# Patient Record
Sex: MALE | Race: BLACK OR AFRICAN AMERICAN | Employment: OTHER | URBAN - METROPOLITAN AREA
[De-identification: names, ages, dates, MRNs, and addresses within clinical notes are randomized per-mention and may not be internally consistent; named-entity substitution may affect disease eponyms.]

---

## 2023-10-25 ENCOUNTER — HOSPITAL ENCOUNTER (EMERGENCY)
Age: 63
Discharge: HOME | End: 2023-10-25
Attending: EMERGENCY MEDICINE
Payer: OTHER GOVERNMENT

## 2023-10-25 ENCOUNTER — APPOINTMENT (OUTPATIENT)
Dept: CT IMAGING | Age: 63
End: 2023-10-25
Payer: OTHER GOVERNMENT

## 2023-10-25 VITALS
TEMPERATURE: 97.9 F | SYSTOLIC BLOOD PRESSURE: 116 MMHG | HEART RATE: 72 BPM | RESPIRATION RATE: 16 BRPM | WEIGHT: 198 LBS | HEIGHT: 69 IN | DIASTOLIC BLOOD PRESSURE: 76 MMHG | OXYGEN SATURATION: 100 % | BODY MASS INDEX: 29.33 KG/M2

## 2023-10-25 DIAGNOSIS — R10.31 ABDOMINAL PAIN, RLQ (RIGHT LOWER QUADRANT): Primary | ICD-10-CM

## 2023-10-25 DIAGNOSIS — Z87.09 HX OF PULMONARY FIBROSIS: ICD-10-CM

## 2023-10-25 DIAGNOSIS — M54.2 NECK PAIN: ICD-10-CM

## 2023-10-25 DIAGNOSIS — Z86.79 HX OF CORONARY ARTERY DISEASE: ICD-10-CM

## 2023-10-25 LAB
ALBUMIN SERPL-MCNC: 3.5 G/DL (ref 3.2–4.6)
ALBUMIN/GLOB SERPL: 0.9 (ref 0.4–1.6)
ALP SERPL-CCNC: 150 U/L (ref 50–136)
ALT SERPL-CCNC: 29 U/L (ref 12–65)
ANION GAP SERPL CALC-SCNC: 4 MMOL/L (ref 2–11)
AST SERPL-CCNC: 31 U/L (ref 15–37)
BACTERIA URNS QL MICRO: NEGATIVE /HPF
BASOPHILS # BLD: 0 K/UL (ref 0–0.2)
BASOPHILS NFR BLD: 1 % (ref 0–2)
BILIRUB SERPL-MCNC: 0.4 MG/DL (ref 0.2–1.1)
BILIRUB UR QL: NEGATIVE
BUN SERPL-MCNC: 15 MG/DL (ref 8–23)
CALCIUM SERPL-MCNC: 9 MG/DL (ref 8.3–10.4)
CASTS URNS QL MICRO: NORMAL /LPF
CHLORIDE SERPL-SCNC: 110 MMOL/L (ref 101–110)
CO2 SERPL-SCNC: 25 MMOL/L (ref 21–32)
CREAT SERPL-MCNC: 1.4 MG/DL (ref 0.8–1.5)
DIFFERENTIAL METHOD BLD: ABNORMAL
EOSINOPHIL # BLD: 0.2 K/UL (ref 0–0.8)
EOSINOPHIL NFR BLD: 3 % (ref 0.5–7.8)
EPI CELLS #/AREA URNS HPF: NORMAL /HPF
ERYTHROCYTE [DISTWIDTH] IN BLOOD BY AUTOMATED COUNT: 15.8 % (ref 11.9–14.6)
GLOBULIN SER CALC-MCNC: 3.7 G/DL (ref 2.8–4.5)
GLUCOSE SERPL-MCNC: 101 MG/DL (ref 65–100)
GLUCOSE UR QL STRIP.AUTO: 500 MG/DL
HCT VFR BLD AUTO: 36.5 % (ref 41.1–50.3)
HGB BLD-MCNC: 12.1 G/DL (ref 13.6–17.2)
IMM GRANULOCYTES # BLD AUTO: 0 K/UL (ref 0–0.5)
IMM GRANULOCYTES NFR BLD AUTO: 0 % (ref 0–5)
KETONES UR-MCNC: NEGATIVE MG/DL
LEUKOCYTE ESTERASE UR QL STRIP: NEGATIVE
LYMPHOCYTES # BLD: 1.5 K/UL (ref 0.5–4.6)
LYMPHOCYTES NFR BLD: 29 % (ref 13–44)
MCH RBC QN AUTO: 30.3 PG (ref 26.1–32.9)
MCHC RBC AUTO-ENTMCNC: 33.2 G/DL (ref 31.4–35)
MCV RBC AUTO: 91.3 FL (ref 82–102)
MONOCYTES # BLD: 0.6 K/UL (ref 0.1–1.3)
MONOCYTES NFR BLD: 12 % (ref 4–12)
NEUTS SEG # BLD: 2.9 K/UL (ref 1.7–8.2)
NEUTS SEG NFR BLD: 55 % (ref 43–78)
NITRITE UR QL: NEGATIVE
NRBC # BLD: 0 K/UL (ref 0–0.2)
PH UR: 5.5 (ref 5–9)
PLATELET # BLD AUTO: 231 K/UL (ref 150–450)
PMV BLD AUTO: 10.5 FL (ref 9.4–12.3)
POTASSIUM SERPL-SCNC: 4.5 MMOL/L (ref 3.5–5.1)
PROT SERPL-MCNC: 7.2 G/DL (ref 6.3–8.2)
PROT UR QL: NEGATIVE MG/DL
RBC # BLD AUTO: 4 M/UL (ref 4.23–5.6)
RBC # UR STRIP: NEGATIVE
RBC #/AREA URNS HPF: NORMAL /HPF
SERVICE CMNT-IMP: ABNORMAL
SODIUM SERPL-SCNC: 139 MMOL/L (ref 133–143)
SP GR UR: 1.02 (ref 1–1.02)
UROBILINOGEN UR QL: 0.2 EU/DL (ref 0.2–1)
WBC # BLD AUTO: 5.3 K/UL (ref 4.3–11.1)
WBC URNS QL MICRO: NORMAL /HPF

## 2023-10-25 PROCEDURE — 2580000003 HC RX 258: Performed by: EMERGENCY MEDICINE

## 2023-10-25 PROCEDURE — 70491 CT SOFT TISSUE NECK W/DYE: CPT

## 2023-10-25 PROCEDURE — 80053 COMPREHEN METABOLIC PANEL: CPT

## 2023-10-25 PROCEDURE — 6360000004 HC RX CONTRAST MEDICATION: Performed by: EMERGENCY MEDICINE

## 2023-10-25 PROCEDURE — 81001 URINALYSIS AUTO W/SCOPE: CPT

## 2023-10-25 PROCEDURE — 85025 COMPLETE CBC W/AUTO DIFF WBC: CPT

## 2023-10-25 PROCEDURE — 81015 MICROSCOPIC EXAM OF URINE: CPT

## 2023-10-25 PROCEDURE — 81003 URINALYSIS AUTO W/O SCOPE: CPT

## 2023-10-25 PROCEDURE — 74177 CT ABD & PELVIS W/CONTRAST: CPT

## 2023-10-25 PROCEDURE — 99285 EMERGENCY DEPT VISIT HI MDM: CPT

## 2023-10-25 RX ORDER — GABAPENTIN 300 MG/1
300 CAPSULE ORAL 3 TIMES DAILY
COMMUNITY

## 2023-10-25 RX ORDER — EPLERENONE 25 MG/1
25 TABLET, FILM COATED ORAL DAILY
COMMUNITY

## 2023-10-25 RX ORDER — METOPROLOL SUCCINATE 50 MG/1
50 TABLET, EXTENDED RELEASE ORAL DAILY
COMMUNITY

## 2023-10-25 RX ORDER — POTASSIUM CHLORIDE 1.5 G/1.58G
20 POWDER, FOR SOLUTION ORAL 2 TIMES DAILY
COMMUNITY

## 2023-10-25 RX ORDER — ALLOPURINOL 100 MG/1
100 TABLET ORAL DAILY
COMMUNITY

## 2023-10-25 RX ORDER — ALBUTEROL SULFATE 2.5 MG/3ML
2.5 SOLUTION RESPIRATORY (INHALATION) EVERY 6 HOURS PRN
COMMUNITY

## 2023-10-25 RX ORDER — ONDANSETRON 4 MG/1
4 TABLET, ORALLY DISINTEGRATING ORAL 3 TIMES DAILY PRN
Qty: 9 TABLET | Refills: 0 | Status: SHIPPED | OUTPATIENT
Start: 2023-10-25 | End: 2023-10-28

## 2023-10-25 RX ORDER — FUROSEMIDE 40 MG/1
40 TABLET ORAL DAILY
COMMUNITY

## 2023-10-25 RX ORDER — TRAMADOL HYDROCHLORIDE 50 MG/1
50 TABLET ORAL EVERY 6 HOURS PRN
COMMUNITY
End: 2023-10-25

## 2023-10-25 RX ORDER — LISINOPRIL 5 MG/1
5 TABLET ORAL DAILY
COMMUNITY

## 2023-10-25 RX ORDER — PANTOPRAZOLE SODIUM 40 MG/1
40 TABLET, DELAYED RELEASE ORAL DAILY
COMMUNITY

## 2023-10-25 RX ORDER — ASPIRIN 81 MG/1
81 TABLET ORAL DAILY
COMMUNITY

## 2023-10-25 RX ORDER — TRAMADOL HYDROCHLORIDE 50 MG/1
50 TABLET ORAL 3 TIMES DAILY PRN
Qty: 11 TABLET | Refills: 0 | Status: SHIPPED | OUTPATIENT
Start: 2023-10-25 | End: 2023-10-28

## 2023-10-25 RX ORDER — ATORVASTATIN CALCIUM 80 MG/1
80 TABLET, FILM COATED ORAL DAILY
COMMUNITY

## 2023-10-25 RX ORDER — NITROGLYCERIN 0.4 MG/1
0.4 TABLET SUBLINGUAL EVERY 5 MIN PRN
COMMUNITY

## 2023-10-25 RX ORDER — 0.9 % SODIUM CHLORIDE 0.9 %
1000 INTRAVENOUS SOLUTION INTRAVENOUS ONCE
Status: COMPLETED | OUTPATIENT
Start: 2023-10-25 | End: 2023-10-25

## 2023-10-25 RX ADMIN — DIATRIZOATE MEGLUMINE AND DIATRIZOATE SODIUM 15 ML: 660; 100 LIQUID ORAL; RECTAL at 11:52

## 2023-10-25 RX ADMIN — SODIUM CHLORIDE 1000 ML: 9 INJECTION, SOLUTION INTRAVENOUS at 11:52

## 2023-10-25 ASSESSMENT — PAIN SCALES - GENERAL: PAINLEVEL_OUTOF10: 7

## 2023-10-25 ASSESSMENT — ENCOUNTER SYMPTOMS
ABDOMINAL PAIN: 1
FACIAL SWELLING: 0
COUGH: 0
SHORTNESS OF BREATH: 0
NAUSEA: 0
RHINORRHEA: 0
VOMITING: 0

## 2023-10-25 ASSESSMENT — LIFESTYLE VARIABLES
HOW MANY STANDARD DRINKS CONTAINING ALCOHOL DO YOU HAVE ON A TYPICAL DAY: 1 OR 2
HOW OFTEN DO YOU HAVE A DRINK CONTAINING ALCOHOL: MONTHLY OR LESS

## 2023-10-25 ASSESSMENT — PAIN DESCRIPTION - LOCATION: LOCATION: GROIN

## 2023-10-25 NOTE — ED TRIAGE NOTES
Pt reports x2 months of cough and sore throat. Also reports x3 months of right groin pain, worse with movement. Denies urinary s/s. Reports was placed on Abx before but it did not help.

## 2023-10-25 NOTE — DISCHARGE INSTRUCTIONS
Please return with any fevers, vomiting, difficulty breathing, worsening symptoms, or additional concerns. Please follow-up with your various specialists for further evaluation.

## 2023-10-25 NOTE — ED PROVIDER NOTES
clear.  Parotid gland: Enhance symmetrically. Submandibular glands: Normal size and enhance symmetrically. Nasopharynx: Unremarkable. Oral cavity: No gross mass. Larynx: Symmetric. Thyroid: Tiny nodule on the left. Lymph nodes: No gross adenopathy. Lung apices:  Clear. Superior mediastinum: Unremarkable without adenopathy. Bones: Unremarkable. No suprasternal mass. Mild degenerative changes cervical  spine. Impression    No acute abnormality.          CBC with Auto Differential   Result Value Ref Range    WBC 5.3 4.3 - 11.1 K/uL    RBC 4.00 (L) 4.23 - 5.6 M/uL    Hemoglobin 12.1 (L) 13.6 - 17.2 g/dL    Hematocrit 36.5 (L) 41.1 - 50.3 %    MCV 91.3 82 - 102 FL    MCH 30.3 26.1 - 32.9 PG    MCHC 33.2 31.4 - 35.0 g/dL    RDW 15.8 (H) 11.9 - 14.6 %    Platelets 704 063 - 958 K/uL    MPV 10.5 9.4 - 12.3 FL    nRBC 0.00 0.0 - 0.2 K/uL    Differential Type AUTOMATED      Neutrophils % 55 43 - 78 %    Lymphocytes % 29 13 - 44 %    Monocytes % 12 4.0 - 12.0 %    Eosinophils % 3 0.5 - 7.8 %    Basophils % 1 0.0 - 2.0 %    Immature Granulocytes 0 0.0 - 5.0 %    Neutrophils Absolute 2.9 1.7 - 8.2 K/UL    Lymphocytes Absolute 1.5 0.5 - 4.6 K/UL    Monocytes Absolute 0.6 0.1 - 1.3 K/UL    Eosinophils Absolute 0.2 0.0 - 0.8 K/UL    Basophils Absolute 0.0 0.0 - 0.2 K/UL    Absolute Immature Granulocyte 0.0 0.0 - 0.5 K/UL   Comprehensive Metabolic Panel   Result Value Ref Range    Sodium 139 133 - 143 mmol/L    Potassium 4.5 3.5 - 5.1 mmol/L    Chloride 110 101 - 110 mmol/L    CO2 25 21 - 32 mmol/L    Anion Gap 4 2 - 11 mmol/L    Glucose 101 (H) 65 - 100 mg/dL    BUN 15 8 - 23 MG/DL    Creatinine 1.40 0.8 - 1.5 MG/DL    Est, Glom Filt Rate 56 (L) >60 ml/min/1.73m2    Calcium 9.0 8.3 - 10.4 MG/DL    Total Bilirubin 0.4 0.2 - 1.1 MG/DL    ALT 29 12 - 65 U/L    AST 31 15 - 37 U/L    Alk Phosphatase 150 (H) 50 - 136 U/L    Total Protein 7.2 6.3 - 8.2 g/dL    Albumin 3.5 3.2 - 4.6 g/dL    Globulin 3.7 2.8 - 4.5 g/dL

## 2023-10-30 ENCOUNTER — OFFICE VISIT (OUTPATIENT)
Age: 63
End: 2023-10-30
Payer: OTHER GOVERNMENT

## 2023-10-30 VITALS
BODY MASS INDEX: 30.51 KG/M2 | HEIGHT: 69 IN | WEIGHT: 206 LBS | DIASTOLIC BLOOD PRESSURE: 68 MMHG | SYSTOLIC BLOOD PRESSURE: 100 MMHG | HEART RATE: 63 BPM

## 2023-10-30 DIAGNOSIS — I50.22 CHRONIC HFREF (HEART FAILURE WITH REDUCED EJECTION FRACTION) (HCC): Primary | Chronic | ICD-10-CM

## 2023-10-30 DIAGNOSIS — I48.0 PAROXYSMAL ATRIAL FIBRILLATION (HCC): Chronic | ICD-10-CM

## 2023-10-30 DIAGNOSIS — I25.10 CORONARY ARTERY DISEASE INVOLVING NATIVE CORONARY ARTERY OF NATIVE HEART WITHOUT ANGINA PECTORIS: Chronic | ICD-10-CM

## 2023-10-30 PROCEDURE — 99244 OFF/OP CNSLTJ NEW/EST MOD 40: CPT | Performed by: INTERNAL MEDICINE

## 2023-10-30 PROCEDURE — 93000 ELECTROCARDIOGRAM COMPLETE: CPT | Performed by: INTERNAL MEDICINE

## 2023-10-30 RX ORDER — TRAMADOL HYDROCHLORIDE 50 MG/1
50 TABLET ORAL EVERY 6 HOURS PRN
COMMUNITY

## 2023-10-30 RX ORDER — LIDOCAINE 50 MG/G
1 PATCH TOPICAL DAILY
COMMUNITY

## 2023-10-30 ASSESSMENT — ENCOUNTER SYMPTOMS
COUGH: 1
NAUSEA: 0
SUSPICIOUS LESIONS: 0
VOMITING: 0
DIARRHEA: 0
SORE THROAT: 1
SHORTNESS OF BREATH: 1

## 2023-10-30 NOTE — PROGRESS NOTES
[id number] [facility], [address]  PHONE: [phone]    SUBJECTIVE:   Brien Moreno is a 58 y.o. male 1960   seen for a consultation visit regarding the following:     Chief Complaint   Patient presents with    New Patient    Congestive Heart Failure    Coronary Artery Disease              Consultation is requested for evaluation of New Patient, Congestive Heart Failure, and Coronary Artery Disease   . History of present illness: 58 y.o. male with PMH CAD s/p PCI dRCA, chronic HFrEF (EF 40% 10/2020), paroxysmal afib on Xarelto s/p ablation, ILD, OMID on CPAP presenting to establish care with cardiology. Patient recently moved from [facility]. He reports feeling well overall. He has occasional VO and light-headedness. He denies chest pain, palpitations, syncope. Past Medical History, Past Surgical History, Family history, Social History, and Medications were all reviewed with the patient today and updated as necessary. Allergies   Allergen Reactions    Nsaids      Kidney disease      Phenergan [Promethazine] Nausea And Vomiting     History reviewed. No pertinent past medical history. Past Surgical History:   Procedure Laterality Date    DIAGNOSTIC CARDIAC CATH LAB PROCEDURE      PTCA       Family History   Problem Relation Age of Onset    Kidney Disease Mother     Stroke Father      Social History     Tobacco Use    Smoking status: Never    Smokeless tobacco: Never   Substance Use Topics    Alcohol use: Not on file       ROS:    Review of Systems   Constitutional: Positive for malaise/fatigue and weight loss. Negative for chills, fever and weight gain. HENT:  Positive for sore throat. Negative for congestion. Rhinorrhea   Eyes:  Negative for visual disturbance. Cardiovascular:  Positive for dyspnea on exertion. Negative for chest pain, leg swelling, palpitations and syncope. Respiratory:  Positive for cough and shortness of breath. Endocrine: Positive for polyuria.

## 2023-10-31 ENCOUNTER — TELEPHONE (OUTPATIENT)
Dept: PULMONOLOGY | Age: 63
End: 2023-10-31

## 2023-10-31 NOTE — TELEPHONE ENCOUNTER
Type Date User Summary Attachment   Specialty Comments 10/26/2023 11:35 AM Charlette Lack Ref by Dr. Yashira Starkey for hx of pulmonary fibrosis. Previous imaging CXR 5/29/22 requested [facility], fax [phone]          Patient in ED 10/25/2023 abdominal pain RLQ, neck pain, pulmonary fibrosis, HX CAD-  Wed Oct 25, 2023   1544 His CT scans are unremarkable. I do not have an exact explanation for his symptoms but I do not find thing urgent or emergent. I will refer him to pulmonary and cardiology for his routine health issues and I will also refer him to GI for his symptoms. [AC]     Last Chest CT abd/pelvis:9/5/2020 [facility]  HISTORY:   ORDERING SYSTEM PROVIDED HISTORY:  Please assses for any evidence of bleeding as Hgb is dropping on systemic AC and pt does have recent hx of lung biopsy, also with labs suggestive of possible infecition and recent hx of pyelonephritis, please reassess this and for possible renal abscess,   Lungs/airways: Central airways are patent. No consolidation. Mid and lower lung predominant bronchovascular distributed ground-glass and interlobular septal thickening with corresponding traction bronchiectasis is stable from prior exams. Post VATS in the RML and RLL. I have faxed request to [facility] to obtain 9/5/2020 Ct chest.   Have spoken with Mrs Dallas Lyon in regard to referral and per NP, no additional imaging prior to appointment. Celeste-once imaging is received, schedule patient in soonest available appointment.

## 2023-11-13 ENCOUNTER — OFFICE VISIT (OUTPATIENT)
Dept: FAMILY MEDICINE CLINIC | Facility: CLINIC | Age: 63
End: 2023-11-13

## 2023-11-13 VITALS
WEIGHT: 205 LBS | HEART RATE: 70 BPM | RESPIRATION RATE: 17 BRPM | DIASTOLIC BLOOD PRESSURE: 78 MMHG | TEMPERATURE: 98.1 F | OXYGEN SATURATION: 98 % | HEIGHT: 69 IN | BODY MASS INDEX: 30.36 KG/M2 | SYSTOLIC BLOOD PRESSURE: 110 MMHG

## 2023-11-13 DIAGNOSIS — I10 ESSENTIAL HYPERTENSION: ICD-10-CM

## 2023-11-13 DIAGNOSIS — I25.10 CORONARY ARTERY DISEASE INVOLVING NATIVE CORONARY ARTERY OF NATIVE HEART WITHOUT ANGINA PECTORIS: ICD-10-CM

## 2023-11-13 DIAGNOSIS — N18.31 STAGE 3A CHRONIC KIDNEY DISEASE (HCC): ICD-10-CM

## 2023-11-13 DIAGNOSIS — M25.511 CHRONIC RIGHT SHOULDER PAIN: ICD-10-CM

## 2023-11-13 DIAGNOSIS — G89.29 CHRONIC RIGHT SHOULDER PAIN: ICD-10-CM

## 2023-11-13 DIAGNOSIS — Z76.89 ENCOUNTER TO ESTABLISH CARE WITH NEW DOCTOR: ICD-10-CM

## 2023-11-13 DIAGNOSIS — I50.22 CHRONIC HFREF (HEART FAILURE WITH REDUCED EJECTION FRACTION) (HCC): ICD-10-CM

## 2023-11-13 DIAGNOSIS — M25.661 JOINT STIFFNESS OF BOTH KNEES: ICD-10-CM

## 2023-11-13 DIAGNOSIS — R10.31 INGUINAL PAIN, RIGHT: ICD-10-CM

## 2023-11-13 DIAGNOSIS — I48.0 PAROXYSMAL ATRIAL FIBRILLATION (HCC): ICD-10-CM

## 2023-11-13 DIAGNOSIS — R05.1 ACUTE COUGH: Primary | ICD-10-CM

## 2023-11-13 DIAGNOSIS — M25.662 JOINT STIFFNESS OF BOTH KNEES: ICD-10-CM

## 2023-11-13 DIAGNOSIS — J84.9 INTERSTITIAL LUNG DISEASE (HCC): ICD-10-CM

## 2023-11-13 DIAGNOSIS — E66.9 CLASS 1 OBESITY WITH SERIOUS COMORBIDITY AND BODY MASS INDEX (BMI) OF 30.0 TO 30.9 IN ADULT, UNSPECIFIED OBESITY TYPE: ICD-10-CM

## 2023-11-13 PROBLEM — I48.91 ATRIAL FIBRILLATION WITH RVR (HCC): Status: RESOLVED | Noted: 2020-07-23 | Resolved: 2023-11-13

## 2023-11-13 PROBLEM — N18.30 CHRONIC KIDNEY DISEASE, STAGE III (MODERATE) (HCC): Status: ACTIVE | Noted: 2017-03-06

## 2023-11-13 PROBLEM — I48.91 ATRIAL FIBRILLATION WITH RVR (HCC): Status: ACTIVE | Noted: 2020-07-23

## 2023-11-13 RX ORDER — BENZONATATE 200 MG/1
200 CAPSULE ORAL 3 TIMES DAILY PRN
Qty: 30 CAPSULE | Refills: 0 | Status: SHIPPED | OUTPATIENT
Start: 2023-11-13

## 2023-11-13 RX ORDER — LANOLIN ALCOHOL/MO/W.PET/CERES
400 CREAM (GRAM) TOPICAL DAILY
COMMUNITY

## 2023-11-13 RX ORDER — ASPIRIN 81 MG/1
81 TABLET ORAL DAILY
COMMUNITY

## 2023-11-13 RX ORDER — MELOXICAM 7.5 MG/1
7.5 TABLET ORAL DAILY PRN
Qty: 10 TABLET | Refills: 0 | Status: CANCELLED | OUTPATIENT
Start: 2023-11-13 | End: 2023-11-23

## 2023-11-13 SDOH — ECONOMIC STABILITY: INCOME INSECURITY: HOW HARD IS IT FOR YOU TO PAY FOR THE VERY BASICS LIKE FOOD, HOUSING, MEDICAL CARE, AND HEATING?: NOT HARD AT ALL

## 2023-11-13 SDOH — ECONOMIC STABILITY: FOOD INSECURITY: WITHIN THE PAST 12 MONTHS, THE FOOD YOU BOUGHT JUST DIDN'T LAST AND YOU DIDN'T HAVE MONEY TO GET MORE.: NEVER TRUE

## 2023-11-13 SDOH — ECONOMIC STABILITY: HOUSING INSECURITY
IN THE LAST 12 MONTHS, WAS THERE A TIME WHEN YOU DID NOT HAVE A STEADY PLACE TO SLEEP OR SLEPT IN A SHELTER (INCLUDING NOW)?: NO

## 2023-11-13 SDOH — ECONOMIC STABILITY: FOOD INSECURITY: WITHIN THE PAST 12 MONTHS, YOU WORRIED THAT YOUR FOOD WOULD RUN OUT BEFORE YOU GOT MONEY TO BUY MORE.: NEVER TRUE

## 2023-11-13 ASSESSMENT — PATIENT HEALTH QUESTIONNAIRE - PHQ9
1. LITTLE INTEREST OR PLEASURE IN DOING THINGS: 0
2. FEELING DOWN, DEPRESSED OR HOPELESS: 0
SUM OF ALL RESPONSES TO PHQ9 QUESTIONS 1 & 2: 0
SUM OF ALL RESPONSES TO PHQ QUESTIONS 1-9: 0

## 2023-11-13 NOTE — PROGRESS NOTES
a follow-up appointment with pulmonology on 8 January and has a follow-up appointment with rheumatology on 13 January. Patient is also complaining of some shoulder pain, states he has a follow-up appointment scheduled with orthopedics, however would like to get this evaluated to see if something needs to be done urgently. Denies any other acute complaints or concerns today  Review of Systems   Denies any HA, fevers, chills, N/V,D, chest pain or palpitations, abdominal pain, hematuria, dysuria and hematochezia    Objective   Physical Exam   General: Well appearing, well nourished, NAD. Head: NCAT   Eyes: EOMI, PERRL, conjunctiva clear, sclera non-icteric  ENT: TMs translucent & mobile, hearing intact. Nose: No lesions, septum and turbinates normal  Oral cavity : No lesions, moist mucous membranes, no exudates or tonsillar hypertrophy   Neck: Supple, without lesions  Heart: RRR, No cardiomegaly,murmurs or gallop  Lungs:CTAB, no wheezes or rhonchi  GI: Normal bowel sounds, non tender, non distended, No masses or hernia  Tenderness to palpation along RLQ inguinal fold area  Skin: Good turgor, no rash, unusual bruising or prominent lesions  Extremities: No cyanosis or edema,  peripheral pulses intact, Capillary refill <3 sec. Musculoskeletal: ROM intact. No swelling or erythema. Shoulder Exam:   Inspection of right shoulder reveals no deformity/normal fairly developed musculature without atrophy. Palpation of shoulder reveals pain with mild tenderness wit palpation of the [facility] joint/subacromial area/bicipital groove. Decreased ROM . Jobs Test, Obriens, Neer and Picacho tests negative. Exam of the biceps shows no pain with resisted flexion or pronation. Speed's test/negative     Neurologic: CN II-XII intact. No focal deficits. Sensation intact. DTRs normal   Psychiatric: AAO X3, Appropriate judgment and insight, normal mood and affect.     On this date 11/13/2023 I have spent 62 minutes reviewing previous

## 2023-11-16 ENCOUNTER — TELEPHONE (OUTPATIENT)
Age: 63
End: 2023-11-16

## 2023-11-16 NOTE — TELEPHONE ENCOUNTER
Patient having Lumbar interlaminar epidural on TBD . Requesting Xarelto hold for 3 days prior.  Fax: [phone]

## 2023-11-28 ENCOUNTER — OFFICE VISIT (OUTPATIENT)
Dept: FAMILY MEDICINE CLINIC | Facility: CLINIC | Age: 63
End: 2023-11-28
Payer: OTHER GOVERNMENT

## 2023-11-28 VITALS
WEIGHT: 212 LBS | DIASTOLIC BLOOD PRESSURE: 66 MMHG | RESPIRATION RATE: 17 BRPM | HEIGHT: 69 IN | SYSTOLIC BLOOD PRESSURE: 110 MMHG | HEART RATE: 50 BPM | TEMPERATURE: 97.6 F | OXYGEN SATURATION: 98 % | BODY MASS INDEX: 31.4 KG/M2

## 2023-11-28 DIAGNOSIS — R21 RASH: ICD-10-CM

## 2023-11-28 DIAGNOSIS — L02.411 CUTANEOUS ABSCESS OF RIGHT AXILLA: Primary | ICD-10-CM

## 2023-11-28 PROCEDURE — 99214 OFFICE O/P EST MOD 30 MIN: CPT | Performed by: FAMILY MEDICINE

## 2023-11-28 PROCEDURE — 3078F DIAST BP <80 MM HG: CPT | Performed by: FAMILY MEDICINE

## 2023-11-28 PROCEDURE — 3074F SYST BP LT 130 MM HG: CPT | Performed by: FAMILY MEDICINE

## 2023-11-28 RX ORDER — DOXYCYCLINE 100 MG/1
100 TABLET ORAL 2 TIMES DAILY
Qty: 14 TABLET | Refills: 0 | Status: SHIPPED | OUTPATIENT
Start: 2023-11-28 | End: 2023-12-05

## 2023-11-28 ASSESSMENT — PATIENT HEALTH QUESTIONNAIRE - PHQ9
SUM OF ALL RESPONSES TO PHQ QUESTIONS 1-9: 0
SUM OF ALL RESPONSES TO PHQ9 QUESTIONS 1 & 2: 0
2. FEELING DOWN, DEPRESSED OR HOPELESS: 0
SUM OF ALL RESPONSES TO PHQ QUESTIONS 1-9: 0
1. LITTLE INTEREST OR PLEASURE IN DOING THINGS: 0

## 2023-11-28 NOTE — PROGRESS NOTES
Deedee Crespo (:  1960) is a 58 y.o. male,Established patient, here for evaluation of the following chief complaint(s):  Rash (Rash on left wrist.) and Other (Swollen lymph node under right armpit)     ASSESSMENT/PLAN:  1. Cutaneous abscess of right axilla  Likely a developing skin abscess, tender to palpation on physical exam,  less likely hidradenitis although it can be completed ruled out. Recommended warm compress for symptomatic relief, will prescribe antibiotics and consider incision and drainage if symptoms do not improve. - Doxycycline 100 mg BID for 7 days    2. Rash  Recommended topical skin lotion like CeraVe, also recommended adding hydrocortisone as needed a couple times per day for symptomatic relief from any itchiness    No follow-ups on file. Subjective   SUBJECTIVE/OBJECTIVE:  HPI  Mr. Juve Saucedo is a pleasant 59-year-old male who presents today with acute complaints of painful cyst in his right axilla and a rash on his left forearm wrist.  Patient states that he has had a cyst in his right axillary region for several years less than the size of pea, however noted that over the last day or two, it had increased in size. He states that it is painful to palpate, denies any discharge, fevers, chills or any other symptoms. For the rash on his left forearm, he states that he has tried applying lotions with some improvement and is wondering if there is anything else that could be done. Review of Systems  Denies any HA, fevers, chills, N/V,D, chest pain or palpitations, abdominal pain, hematuria, dysuria and hematochezia     Objective   Physical Exam   General: Well appearing, well nourished, NAD. Head: NCAT   Eyes: conjunctiva clear and antiicteric  Heart: RRR, No cardiomegaly,murmurs or gallop  Lungs:CTAB, no wheezes or rhonchi  Skin: Good turgor, No unusual bruising or prominent lesions.    Rash on fore arm  Induration tender to palpation about 2 cm in diameter on the right axillary

## 2023-12-13 ENCOUNTER — OFFICE VISIT (OUTPATIENT)
Age: 63
End: 2023-12-13
Payer: OTHER GOVERNMENT

## 2023-12-13 VITALS
SYSTOLIC BLOOD PRESSURE: 120 MMHG | WEIGHT: 207.6 LBS | HEART RATE: 74 BPM | BODY MASS INDEX: 30.75 KG/M2 | HEIGHT: 69 IN | DIASTOLIC BLOOD PRESSURE: 76 MMHG | RESPIRATION RATE: 16 BRPM

## 2023-12-13 DIAGNOSIS — R10.31 RLQ ABDOMINAL PAIN: Primary | ICD-10-CM

## 2023-12-13 PROCEDURE — 3078F DIAST BP <80 MM HG: CPT | Performed by: INTERNAL MEDICINE

## 2023-12-13 PROCEDURE — 3074F SYST BP LT 130 MM HG: CPT | Performed by: INTERNAL MEDICINE

## 2023-12-13 PROCEDURE — 99204 OFFICE O/P NEW MOD 45 MIN: CPT | Performed by: INTERNAL MEDICINE

## 2024-01-02 ENCOUNTER — OFFICE VISIT (OUTPATIENT)
Age: 64
End: 2024-01-02
Payer: OTHER GOVERNMENT

## 2024-01-02 VITALS
SYSTOLIC BLOOD PRESSURE: 100 MMHG | HEIGHT: 69 IN | DIASTOLIC BLOOD PRESSURE: 70 MMHG | WEIGHT: 212 LBS | BODY MASS INDEX: 31.4 KG/M2 | HEART RATE: 72 BPM

## 2024-01-02 DIAGNOSIS — I48.0 PAROXYSMAL ATRIAL FIBRILLATION (HCC): Chronic | ICD-10-CM

## 2024-01-02 DIAGNOSIS — I25.10 CORONARY ARTERY DISEASE INVOLVING NATIVE CORONARY ARTERY OF NATIVE HEART WITHOUT ANGINA PECTORIS: Chronic | ICD-10-CM

## 2024-01-02 DIAGNOSIS — I50.22 CHRONIC HFREF (HEART FAILURE WITH REDUCED EJECTION FRACTION) (HCC): Primary | Chronic | ICD-10-CM

## 2024-01-02 PROCEDURE — 99214 OFFICE O/P EST MOD 30 MIN: CPT | Performed by: INTERNAL MEDICINE

## 2024-01-02 PROCEDURE — 3074F SYST BP LT 130 MM HG: CPT | Performed by: INTERNAL MEDICINE

## 2024-01-02 PROCEDURE — 3078F DIAST BP <80 MM HG: CPT | Performed by: INTERNAL MEDICINE

## 2024-01-02 ASSESSMENT — ENCOUNTER SYMPTOMS
BACK PAIN: 1
SHORTNESS OF BREATH: 0

## 2024-01-02 NOTE — PROGRESS NOTES
AM          EKG        CXR/IMAGING        DEVICE INTERROGATION        OUTSIDE RECORDS REVIEW    Records from outside providers have been reviewed and summarized as noted in the HPI, past history and data review sections of this note, and reviewed with patient. .       ASSESSMENT and PLAN    Chronic HFrEF (heart failure with reduced ejection fraction) (HCC)  - Ischemic cardiomyopathy. EF 40-45% with inferior WMA on recent TTE. Euvolemic on exam.  - Continue home GDMT: Jardiance 10mg PO qday, lisinopril 5mg PO qday, eplerenone 25mg PO qday, Toprol XL 50mg PO qday. Defer further uptitration given low-normal BP.  - Continue Lasix 40mg PO qday.  Coronary artery disease involving native coronary artery of native heart without angina pectoris  - H/o distal RCA thrombus s/p thrombectomy and JE. No angina at this time.  - Continue home Xarelto. Continue atorvastatin 80mg.   -     EKG 12 lead  Paroxysmal atrial fibrillation (HCC)  - RRR today. Continue home metoprolol  - [id number] at least 3 (HTN, HF, MI). Continue home Xarelto.      Return in about 6 months (around 7/2/2024) for Routine follow up.          Thank you for allowing me to participate in this patient's care.  Please call or contact me if there are any questions or concerns regarding the above.      Tramaine Villafuerte,   01/02/24  11:13 AM

## 2024-01-09 DIAGNOSIS — J84.9 INTERSTITIAL LUNG DISEASE (HCC): Primary | ICD-10-CM

## 2024-01-10 ENCOUNTER — OFFICE VISIT (OUTPATIENT)
Dept: PULMONOLOGY | Age: 64
End: 2024-01-10
Payer: OTHER GOVERNMENT

## 2024-01-10 VITALS
WEIGHT: 209 LBS | HEIGHT: 69 IN | HEART RATE: 93 BPM | DIASTOLIC BLOOD PRESSURE: 76 MMHG | BODY MASS INDEX: 30.96 KG/M2 | SYSTOLIC BLOOD PRESSURE: 110 MMHG | OXYGEN SATURATION: 93 % | RESPIRATION RATE: 17 BRPM

## 2024-01-10 DIAGNOSIS — R76.8 ANA POSITIVE: ICD-10-CM

## 2024-01-10 DIAGNOSIS — J84.9 ILD (INTERSTITIAL LUNG DISEASE) (HCC): Primary | ICD-10-CM

## 2024-01-10 DIAGNOSIS — R91.1 PULMONARY NODULE: ICD-10-CM

## 2024-01-10 LAB
EXPIRATORY TIME: NORMAL
FEF 25-75% %PRED-PRE: NORMAL
FEF 25-75% PRED: NORMAL
FEF 25-75%-PRE: NORMAL
FEV1 %PRED-PRE: 100 %
FEV1 PRED: NORMAL
FEV1/FVC %PRED-PRE: NORMAL
FEV1/FVC PRED: NORMAL
FEV1/FVC: 80 %
FEV1: 2.93 L
FVC %PRED-PRE: 96 %
FVC PRED: NORMAL
FVC: 3.66 L
PEF %PRED-PRE: NORMAL
PEF PRED: NORMAL
PEF-PRE: NORMAL

## 2024-01-10 PROCEDURE — 99204 OFFICE O/P NEW MOD 45 MIN: CPT | Performed by: INTERNAL MEDICINE

## 2024-01-10 PROCEDURE — 94010 BREATHING CAPACITY TEST: CPT | Performed by: INTERNAL MEDICINE

## 2024-01-10 PROCEDURE — 3078F DIAST BP <80 MM HG: CPT | Performed by: INTERNAL MEDICINE

## 2024-01-10 PROCEDURE — 3074F SYST BP LT 130 MM HG: CPT | Performed by: INTERNAL MEDICINE

## 2024-01-10 ASSESSMENT — PULMONARY FUNCTION TESTS
FVC: 3.66
FVC_PERCENT_PREDICTED_PRE: 96
FEV1: 2.93
FEV1/FVC: 80
FEV1_PERCENT_PREDICTED_PRE: 100

## 2024-01-10 NOTE — PROGRESS NOTES
Name:  Kyle Rojo  YOB: 1960   MRN: [id number]      Office Visit: 1/10/2024        ASSESSMENT AND PLAN:  (Medical Decision Making)    Impression: 63 y.o. male with ILD s/p VATS biopsy 7/21/20 showing +UIP though exact diagnosis seems to be unclear from the records regardless he has been stable for several years on Rituxan every 6 months.    1. ILD (interstitial lung disease) (HCC)  Radiographically this appears to be NSIP and overall stable compared to 2020, clinically improved from initial diagnosis on Rituxan every 6 months, off steroids.  He did previously have positive rheumatoid factor and MARTA and has a rheumatology appointment scheduled next month.  I will be interested in their opinion.  He certainly seems to have clinical response to Rituxan and wonder if this could be a mixed connective tissue versus just primary NSIP.  Regardless it appears that he has clinically responded quite well to Rituxan every 6 months.  Recommend continuing supportive therapy.  He is up-to-date on his influenza vaccine, and Pneumovax and agrees to get RSV and updated COVID-vaccine as well.  - Spirometry Without Bronchodilator  - CT CHEST WO CONTRAST; Future  - DME - DURABLE MEDICAL EQUIPMENT  - respiratory syncytial vaccine, adjuvanted (AREXVY) 120 MCG/0.5ML injection; Inject 0.5 mLs into the muscle once for 1 dose  Dispense: 0.5 mL; Refill: 0    2. Pulmonary nodule  Had a pulmonary nodule that was last imaged April 2023.  The reports are not available, but described the notes that needed follow-up.  I will get this done in 3 months for 1 year follow-up and then decide if he needs further imaging at that point.  - CT CHEST WO CONTRAST; Future    3. MARTA positive  Also has a positive rheumatoid factor.  Rheumatology visit next month as above.  Will be interested in their opinion.  He is try to get Rituxan every 6 months set up locally.    Orders Placed This Encounter   Medications    respiratory syncytial

## 2024-02-07 RX ORDER — EPLERENONE 25 MG/1
12.5 TABLET, FILM COATED ORAL DAILY
Qty: 45 TABLET | Refills: 3 | Status: SHIPPED | OUTPATIENT
Start: 2024-02-07

## 2024-02-07 NOTE — TELEPHONE ENCOUNTER
MEDICATION REFILL REQUEST      Name of Medication:  Eplerenone  Dose:  25 mg  Frequency:  1/2 pill  a day  Quantity:  45  Days' supply:  90      Pharmacy Name/Location:  [facility]- [phone]  Please fax in rx asap ,pt has been with out for 2 weeks

## 2024-02-07 NOTE — TELEPHONE ENCOUNTER
Requested Prescriptions     Pending Prescriptions Disp Refills    eplerenone (INSPRA) 25 MG tablet 45 tablet 3     Sig: Take 0.5 tablets by mouth daily 1/2 tablet

## 2024-02-09 ENCOUNTER — OFFICE VISIT (OUTPATIENT)
Dept: FAMILY MEDICINE CLINIC | Facility: CLINIC | Age: 64
End: 2024-02-09
Payer: OTHER GOVERNMENT

## 2024-02-09 VITALS
HEIGHT: 69 IN | TEMPERATURE: 97.2 F | RESPIRATION RATE: 18 BRPM | WEIGHT: 217 LBS | DIASTOLIC BLOOD PRESSURE: 68 MMHG | HEART RATE: 62 BPM | OXYGEN SATURATION: 98 % | SYSTOLIC BLOOD PRESSURE: 102 MMHG | BODY MASS INDEX: 32.14 KG/M2

## 2024-02-09 DIAGNOSIS — R10.31 INGUINAL PAIN, RIGHT: ICD-10-CM

## 2024-02-09 DIAGNOSIS — R39.11 URINARY HESITANCY: ICD-10-CM

## 2024-02-09 DIAGNOSIS — R35.0 INCREASED URINARY FREQUENCY: Primary | ICD-10-CM

## 2024-02-09 DIAGNOSIS — R30.0 DYSURIA: ICD-10-CM

## 2024-02-09 DIAGNOSIS — L72.3 SEBACEOUS CYST OF RIGHT AXILLA: ICD-10-CM

## 2024-02-09 LAB — PSA SERPL-MCNC: 3.2 NG/ML

## 2024-02-09 PROCEDURE — 99214 OFFICE O/P EST MOD 30 MIN: CPT | Performed by: FAMILY MEDICINE

## 2024-02-09 PROCEDURE — 3074F SYST BP LT 130 MM HG: CPT | Performed by: FAMILY MEDICINE

## 2024-02-09 PROCEDURE — 3078F DIAST BP <80 MM HG: CPT | Performed by: FAMILY MEDICINE

## 2024-02-09 RX ORDER — CLINDAMYCIN PHOSPHATE 10 MG/G
GEL TOPICAL
Qty: 30 G | Refills: 0 | Status: SHIPPED | OUTPATIENT
Start: 2024-02-09 | End: 2024-02-16

## 2024-02-09 RX ORDER — TAMSULOSIN HYDROCHLORIDE 0.4 MG/1
0.4 CAPSULE ORAL DAILY
Qty: 30 CAPSULE | Refills: 0 | Status: SHIPPED | OUTPATIENT
Start: 2024-02-09 | End: 2024-03-10

## 2024-02-09 SDOH — ECONOMIC STABILITY: INCOME INSECURITY: HOW HARD IS IT FOR YOU TO PAY FOR THE VERY BASICS LIKE FOOD, HOUSING, MEDICAL CARE, AND HEATING?: NOT HARD AT ALL

## 2024-02-09 SDOH — ECONOMIC STABILITY: FOOD INSECURITY: WITHIN THE PAST 12 MONTHS, YOU WORRIED THAT YOUR FOOD WOULD RUN OUT BEFORE YOU GOT MONEY TO BUY MORE.: NEVER TRUE

## 2024-02-09 SDOH — ECONOMIC STABILITY: FOOD INSECURITY: WITHIN THE PAST 12 MONTHS, THE FOOD YOU BOUGHT JUST DIDN'T LAST AND YOU DIDN'T HAVE MONEY TO GET MORE.: NEVER TRUE

## 2024-02-09 ASSESSMENT — PATIENT HEALTH QUESTIONNAIRE - PHQ9
SUM OF ALL RESPONSES TO PHQ QUESTIONS 1-9: 0
1. LITTLE INTEREST OR PLEASURE IN DOING THINGS: 0
2. FEELING DOWN, DEPRESSED OR HOPELESS: 0

## 2024-02-09 NOTE — PROGRESS NOTES
Kyle Rojo (:  1960) is a 63 y.o. male,Established patient, here for evaluation of the following chief complaint(s):  Hypertension (3 month follow up), Medication Refill, and OTHER (Discuss personal concerns)         ASSESSMENT/PLAN:  1. Increased urinary frequency  -     PSA Screening; Future  2. Dysuria  3. Urinary hesitancy  Based on patient's presenting symptoms, likely neurogenic bladder versus BPH?  Will obtain a PSA level given his family history.  Review of EMR also shows that based on all the testing met that he was towards the higher end of the normal range for PSA levels.  Also recommended scheduled voiding, will start him on tamsulosin 0.4 mg daily   -Will consider referral to urology if his symptoms persist or worsen over the next 4 to 6 weeks pending PSA test results  -     PSA Screening; Future  4. Inguinal pain, right  Patient has had an evaluation in the ED with a CT abdomen abdomen and pelvis which is noted to be unremarkable, he had a follow-up with GI for RLQ pain and they were planning a follow-up colonoscopy if symptoms persist.  Recommended he revisit in the ED for a CT scan should he experience any exacerbation of his symptoms to get a more clear assessment of his appendix which showed some mild changes.  -Based on patient's clinical history provided today and location of pain, this appears to be a groin strain.  No hernias noted.  Plenty of reassurance provided on a handout with groin exercises was also provided  5. Sebaceous cyst of right axilla  Does not appear to be infected, will hold off any oral antibiotics for now.  I did recommend he could try topical clindamycin to see if that helps resolve the sporadic foul order potentially from reinfection of the cyst?.  -     clindamycin (CLEOCIN-T) 1 % gel; Apply topically 2 times daily., Disp-30 g, R-0, Normal    No follow-ups on file.       Subjective   SUBJECTIVE/OBJECTIVE:  HPI  Mr. Wright is a pleasant 63-year-old male who

## 2024-02-19 ENCOUNTER — TELEPHONE (OUTPATIENT)
Dept: FAMILY MEDICINE CLINIC | Facility: CLINIC | Age: 64
End: 2024-02-19

## 2024-02-19 ENCOUNTER — NURSE ONLY (OUTPATIENT)
Age: 64
End: 2024-02-19
Payer: OTHER GOVERNMENT

## 2024-02-19 DIAGNOSIS — J84.9 ILD (INTERSTITIAL LUNG DISEASE) (HCC): Primary | ICD-10-CM

## 2024-02-19 DIAGNOSIS — L72.3 SEBACEOUS CYST OF RIGHT AXILLA: Primary | ICD-10-CM

## 2024-02-19 DIAGNOSIS — R21 RASH: ICD-10-CM

## 2024-02-19 LAB
FEV 1 , POC: 2.9 L
FEV1 % PRED, POC: 92 %
FEV1/FVC, POC: NORMAL
FVC % PRED, POC: 95 %
FVC, POC: NORMAL

## 2024-02-19 PROCEDURE — 94729 DIFFUSING CAPACITY: CPT | Performed by: INTERNAL MEDICINE

## 2024-02-19 PROCEDURE — 94060 EVALUATION OF WHEEZING: CPT | Performed by: INTERNAL MEDICINE

## 2024-02-19 PROCEDURE — 94726 PLETHYSMOGRAPHY LUNG VOLUMES: CPT | Performed by: INTERNAL MEDICINE

## 2024-02-19 RX ORDER — CLINDAMYCIN AND BENZOYL PEROXIDE 10; 50 MG/G; MG/G
GEL TOPICAL
Qty: 50 G | Refills: 0 | Status: SHIPPED | OUTPATIENT
Start: 2024-02-19

## 2024-02-19 ASSESSMENT — PULMONARY FUNCTION TESTS
FVC_PERCENT_PREDICTED_POC: 95
FEV1_PERCENT_PREDICTED_POC: 92

## 2024-02-19 NOTE — TELEPHONE ENCOUNTER
Patient was in the office today & said he saw you 2/9/24 & was supposed to have a clindamycin cream sent in to [facility] [address] but they have not received it yet & asked if you can send it in for him

## 2024-02-19 NOTE — PROGRESS NOTES
furosemide (LASIX) 40 MG tablet Take 1 tablet by mouth daily      albuterol (PROVENTIL) (2.5 MG/3ML) 0.083% nebulizer solution Take 3 mLs by nebulization every 6 hours as needed for Wheezing      sertraline (ZOLOFT) 50 MG tablet Take 1 tablet by mouth daily      ALPROSTADIL, VASODILATOR, UR Place 500 mcg into the urethra      pantoprazole (PROTONIX) 40 MG tablet Take 1 tablet by mouth daily      allopurinol (ZYLOPRIM) 100 MG tablet Take 1 tablet by mouth daily      vitamin D (CHOLECALCIFEROL) 25 MCG (1000 UT) TABS tablet Take 1 tablet by mouth in the morning and at bedtime      atorvastatin (LIPITOR) 80 MG tablet Take 1 tablet by mouth daily      rivaroxaban (XARELTO) 20 MG TABS tablet Take 1 tablet by mouth      nitroGLYCERIN (NITROSTAT) 0.4 MG SL tablet Place 1 tablet under the tongue every 5 minutes as needed for Chest pain up to max of 3 total doses. If no relief after 1 dose, call 911.      lisinopril (PRINIVIL;ZESTRIL) 5 MG tablet Take 1 tablet by mouth daily      metoprolol succinate (TOPROL XL) 50 MG extended release tablet Take 1 tablet by mouth daily      empagliflozin (JARDIANCE) 10 MG tablet Take 1 tablet by mouth daily       No facility-administered encounter medications on file as of 2/20/2024.          Review of Systems      All 11 systems reviewed and were negative.            The [place] database for controlled substance prescription monitoring was reviewed.    Date: February 19, 2024  Patient Name: Kyle Rojo  MRN:[id number]  PCP: Tuan Urban    I personally performed the HPI, exam, and assessment/plan, verified the documentation and approve it is updated, accurate, and complete. Parts or the entirety of this document were transcribed utilizing voice recognition software. Transcription errors may be present.    Terry Ramos M.D.

## 2024-02-19 NOTE — PROGRESS NOTES
CPFT performed. Pt only able to do SVC manuveur of lung volumes due to unable to bend knees to fit into box. PANKAJ

## 2024-02-20 ENCOUNTER — OFFICE VISIT (OUTPATIENT)
Age: 64
End: 2024-02-20
Payer: OTHER GOVERNMENT

## 2024-02-20 DIAGNOSIS — M51.36 DDD (DEGENERATIVE DISC DISEASE), LUMBAR: Primary | ICD-10-CM

## 2024-02-20 DIAGNOSIS — M75.52 SUBACROMIAL BURSITIS OF LEFT SHOULDER JOINT: ICD-10-CM

## 2024-02-20 PROCEDURE — 99213 OFFICE O/P EST LOW 20 MIN: CPT | Performed by: ANESTHESIOLOGY

## 2024-02-20 RX ORDER — METHYLPREDNISOLONE 4 MG/1
TABLET ORAL
Qty: 21 KIT | Refills: 0 | Status: SHIPPED | OUTPATIENT
Start: 2024-02-20 | End: 2024-02-26

## 2024-02-21 ENCOUNTER — TELEPHONE (OUTPATIENT)
Dept: PULMONOLOGY | Age: 64
End: 2024-02-21

## 2024-02-21 NOTE — TELEPHONE ENCOUNTER
----- Message from Bri Moss MD sent at 2/21/2024  1:08 PM EST -----  PFTs appear stable compared to prior. Will keep follow up as currently planned.

## 2024-02-21 NOTE — TELEPHONE ENCOUNTER
Spoke with the patient in regards to their CPFT results, explained per Dr. Moss that the PFTs appear stable compared to prior and that we will keep our current follow up for now.  Patient understood the results and did not have any further questions or concerns at this time.  // Gloria Braga M.A.

## 2024-03-14 ENCOUNTER — OFFICE VISIT (OUTPATIENT)
Dept: FAMILY MEDICINE CLINIC | Facility: CLINIC | Age: 64
End: 2024-03-14

## 2024-03-14 VITALS
TEMPERATURE: 98.8 F | OXYGEN SATURATION: 99 % | BODY MASS INDEX: 31.87 KG/M2 | RESPIRATION RATE: 17 BRPM | HEIGHT: 69 IN | DIASTOLIC BLOOD PRESSURE: 70 MMHG | SYSTOLIC BLOOD PRESSURE: 110 MMHG | WEIGHT: 215.2 LBS | HEART RATE: 81 BPM

## 2024-03-14 DIAGNOSIS — Z00.00 ENCOUNTER FOR WELL ADULT EXAM WITHOUT ABNORMAL FINDINGS: Primary | ICD-10-CM

## 2024-03-14 DIAGNOSIS — Z00.00 ENCOUNTER FOR WELL ADULT EXAM WITHOUT ABNORMAL FINDINGS: ICD-10-CM

## 2024-03-14 DIAGNOSIS — R05.1 ACUTE COUGH: ICD-10-CM

## 2024-03-14 DIAGNOSIS — Z71.89 ACP (ADVANCE CARE PLANNING): ICD-10-CM

## 2024-03-14 DIAGNOSIS — Z13.220 SCREENING FOR HYPERLIPIDEMIA: ICD-10-CM

## 2024-03-14 LAB
ALBUMIN SERPL-MCNC: 3.6 G/DL (ref 3.2–4.6)
ALBUMIN/GLOB SERPL: 0.9 (ref 0.4–1.6)
ALP SERPL-CCNC: 135 U/L (ref 50–136)
ALT SERPL-CCNC: 34 U/L (ref 12–65)
ANION GAP SERPL CALC-SCNC: 3 MMOL/L (ref 2–11)
AST SERPL-CCNC: 27 U/L (ref 15–37)
BILIRUB SERPL-MCNC: 0.4 MG/DL (ref 0.2–1.1)
BUN SERPL-MCNC: 18 MG/DL (ref 8–23)
CALCIUM SERPL-MCNC: 10 MG/DL (ref 8.3–10.4)
CHLORIDE SERPL-SCNC: 106 MMOL/L (ref 103–113)
CHOLEST SERPL-MCNC: 126 MG/DL
CO2 SERPL-SCNC: 30 MMOL/L (ref 21–32)
CREAT SERPL-MCNC: 1.4 MG/DL (ref 0.8–1.5)
GLOBULIN SER CALC-MCNC: 3.9 G/DL (ref 2.8–4.5)
GLUCOSE SERPL-MCNC: 86 MG/DL (ref 65–100)
HDLC SERPL-MCNC: 42 MG/DL (ref 40–60)
HDLC SERPL: 3
LDLC SERPL CALC-MCNC: 61.8 MG/DL
POTASSIUM SERPL-SCNC: 4.8 MMOL/L (ref 3.5–5.1)
PROT SERPL-MCNC: 7.5 G/DL (ref 6.3–8.2)
SODIUM SERPL-SCNC: 139 MMOL/L (ref 136–146)
TRIGL SERPL-MCNC: 111 MG/DL (ref 35–150)
VLDLC SERPL CALC-MCNC: 22.2 MG/DL (ref 6–23)

## 2024-03-14 RX ORDER — TAMSULOSIN HYDROCHLORIDE 0.4 MG/1
0.4 CAPSULE ORAL DAILY
Qty: 30 CAPSULE | Refills: 0 | Status: SHIPPED | OUTPATIENT
Start: 2024-03-14 | End: 2024-04-13

## 2024-03-14 RX ORDER — BENZONATATE 100 MG/1
100 CAPSULE ORAL 3 TIMES DAILY PRN
Qty: 30 CAPSULE | Refills: 0 | Status: SHIPPED | OUTPATIENT
Start: 2024-03-14 | End: 2024-03-24

## 2024-03-14 ASSESSMENT — PATIENT HEALTH QUESTIONNAIRE - PHQ9
SUM OF ALL RESPONSES TO PHQ QUESTIONS 1-9: 0
SUM OF ALL RESPONSES TO PHQ QUESTIONS 1-9: 0
1. LITTLE INTEREST OR PLEASURE IN DOING THINGS: 0
SUM OF ALL RESPONSES TO PHQ QUESTIONS 1-9: 0
SUM OF ALL RESPONSES TO PHQ QUESTIONS 1-9: 0
SUM OF ALL RESPONSES TO PHQ9 QUESTIONS 1 & 2: 0
2. FEELING DOWN, DEPRESSED OR HOPELESS: 0

## 2024-03-14 NOTE — PROGRESS NOTES
ProviderCamryn MD   vitamin D (CHOLECALCIFEROL) 25 MCG (1000 UT) TABS tablet Take 1 tablet by mouth in the morning and at bedtime Yes Camryn Hillman MD   atorvastatin (LIPITOR) 80 MG tablet Take 1 tablet by mouth daily Yes Camryn Hillman MD   rivaroxaban (XARELTO) 20 MG TABS tablet Take 1 tablet by mouth Yes Camryn Hillman MD   nitroGLYCERIN (NITROSTAT) 0.4 MG SL tablet Place 1 tablet under the tongue every 5 minutes as needed for Chest pain up to max of 3 total doses. If no relief after 1 dose, call 911. Yes Camryn Hillman MD   lisinopril (PRINIVIL;ZESTRIL) 5 MG tablet Take 1 tablet by mouth daily Yes Camryn Hillman MD   metoprolol succinate (TOPROL XL) 50 MG extended release tablet Take 1 tablet by mouth daily Yes Camryn Hillman MD   empagliflozin (JARDIANCE) 10 MG tablet Take 1 tablet by mouth daily Yes Camryn Hillman MD         Past Medical History:   Diagnosis Date    A-fib (HCC)     Arthritis, infective, shoulder (HCC)     Coronary artery disease     Degenerative joint disease     GERD (gastroesophageal reflux disease)     Gout     Hypertension     Joint stiffness of both knees     Lumbar degenerative disc disease     Lung disease        Past Surgical History:   Procedure Laterality Date    DIAGNOSTIC CARDIAC CATH LAB PROCEDURE      KNEE SURGERY      PTCA           Family History   Problem Relation Age of Onset    Kidney Disease Mother     Stroke Father        Social History     Tobacco Use    Smoking status: Never    Smokeless tobacco: Never   Vaping Use    Vaping Use: Never used   Substance Use Topics    Alcohol use: Not Currently    Drug use: Never       Objective     Vital Signs  /70   Pulse 81   Temp 98.8 °F (37.1 °C) (Temporal)   Resp 17   Ht 1.753 m (5' 9\")   Wt 97.6 kg (215 lb 3.2 oz)   SpO2 99%   BMI 31.78 kg/m²   Wt Readings from Last 3 Encounters:   03/14/24 97.6 kg (215 lb 3.2 oz)   02/09/24 98.4 kg (217 lb)   01/10/24 94.8 kg

## 2024-04-08 ENCOUNTER — HOSPITAL ENCOUNTER (OUTPATIENT)
Dept: CT IMAGING | Age: 64
Discharge: HOME | End: 2024-04-10
Payer: OTHER GOVERNMENT

## 2024-04-08 DIAGNOSIS — J84.9 ILD (INTERSTITIAL LUNG DISEASE) (HCC): ICD-10-CM

## 2024-04-08 DIAGNOSIS — R91.1 PULMONARY NODULE: ICD-10-CM

## 2024-04-08 PROCEDURE — 71250 CT THORAX DX C-: CPT

## 2024-04-10 ENCOUNTER — OFFICE VISIT (OUTPATIENT)
Dept: PULMONOLOGY | Age: 64
End: 2024-04-10
Payer: OTHER GOVERNMENT

## 2024-04-10 VITALS
BODY MASS INDEX: 32.58 KG/M2 | TEMPERATURE: 97.4 F | SYSTOLIC BLOOD PRESSURE: 108 MMHG | DIASTOLIC BLOOD PRESSURE: 77 MMHG | OXYGEN SATURATION: 98 % | HEIGHT: 69 IN | WEIGHT: 220 LBS | RESPIRATION RATE: 19 BRPM | HEART RATE: 70 BPM

## 2024-04-10 DIAGNOSIS — R76.8 ANA POSITIVE: ICD-10-CM

## 2024-04-10 DIAGNOSIS — J84.9 ILD (INTERSTITIAL LUNG DISEASE) (HCC): Primary | ICD-10-CM

## 2024-04-10 DIAGNOSIS — R91.1 PULMONARY NODULE: ICD-10-CM

## 2024-04-10 PROCEDURE — 3074F SYST BP LT 130 MM HG: CPT | Performed by: INTERNAL MEDICINE

## 2024-04-10 PROCEDURE — 3078F DIAST BP <80 MM HG: CPT | Performed by: INTERNAL MEDICINE

## 2024-04-10 PROCEDURE — 99214 OFFICE O/P EST MOD 30 MIN: CPT | Performed by: INTERNAL MEDICINE

## 2024-04-10 NOTE — PROGRESS NOTES
Name:  Kyle Rojo  YOB: 1960   MRN: [id number]      Office Visit: 4/10/2024        ASSESSMENT AND PLAN:  (Medical Decision Making)    Impression: 63 y.o. male with ILD s/p VATS biopsy 7/21/20 showing +UIP though exact diagnosis seems to be unclear from the records regardless he has been stable for several years on Rituxan every 6 months.    1. ILD (interstitial lung disease) (HCC)  Stable CT scan and PFTs.  Pending Rituxan next month with rheumatology.  It appears they are still trying to get some records for what was previously diagnosed.  I will plan on having him back in 6 months with complete PFTs.  If his vital signs are stable and PFTs are stable and may have been extend this out to yearly follow-up.    2. Pulmonary nodule  No additional nodules seen.  No specific additional CT scan needs.    3. MARTA positive  Rheumatology following.  There try to track down rheumatology records as the exact diagnosis that was used for Rituxan was unclear [facility].    There was also some mild aneurysmal dilation of his aortic root.  I will send a message to his cardiologist and see if he can monitor that on echo or if we need to send him to vascular for evaluation.  Size does not appear to be significant for urgent vascular referral and there is no aortic valve functional abnormalities based on his last echocardiogram.    No orders of the defined types were placed in this encounter.    No orders of the defined types were placed in this encounter.    Follow-up and Dispositions    Return in about 6 months (around 10/10/2024) for  , Dr. Moss or TODD Patel PFTs at follow up.     Bri Moss MD    No specialty comments available.  _________________________________________________________________________    HISTORY OF PRESENT ILLNESS:    Mr. Kyle Rojo is a 63 y.o. male who is seen at [facility] for  ILD (interstitial lung disease)  Here for h/o ILD with chronic

## 2024-04-18 ENCOUNTER — HOSPITAL ENCOUNTER (OUTPATIENT)
Dept: MAMMOGRAPHY | Age: 64
Discharge: HOME | End: 2024-04-18
Payer: OTHER GOVERNMENT

## 2024-04-18 DIAGNOSIS — Z91.89 AT RISK FOR OSTEOPOROSIS: ICD-10-CM

## 2024-04-18 PROCEDURE — 77080 DXA BONE DENSITY AXIAL: CPT

## 2024-05-02 NOTE — PROGRESS NOTES
position either sitting or standing or ambulating.  Facet loading positive today more so than in the past.  Tender palpation over lumbar facets in the lower lumbar region.  We discussed that in combination with his skilled therapies and conservative measures for well beyond 6 months I would recommend pursuing medial branch blocks with potential RFA pending response.  He expressed understanding and desire to move forward this plan.        Narcan nasal spray was prescribed on:    No orders of the defined types were placed in this encounter.    Requested Prescriptions      No prescriptions requested or ordered in this encounter          Referring Provider: No ref. provider found  Assessment:      Chief Complaint: No chief complaint on file.      Kyle Rojo is a 63 y.o. male [facility] the following diagnoses:    Diagnosis:  No diagnosis found.      Subjective:      HPI:  Mr. Rojo is seen in consultation at the request of No ref. provider found for evaluation and recommendations regarding the above diagnoses and the below HPI.    HPI on05/02/24: 63-year-old male presents for evaluation treatment of low back left shoulder pain.  Patient reports low back send present for a number of years but is experiencing recent flare beginning last Friday.  He has received Medrol Dosepaks intermittently in the past which provided him significant benefit and relief.  Left shoulder pain has been present for several years without specific inciting event.  Pain is on the lateral aspect of his shoulder does not refer in the upper extremity.  It is exacerbated with activity and functioning particularly use with left upper extremity.  Denies weakness.  This limits his ability to care for himself and others.  Imaging in the past per patient displays degeneration.  He has performed home exercise regimen in the past when being treated with right-sided shoulder pain.  This is filled providing significant

## 2024-05-09 ENCOUNTER — OFFICE VISIT (OUTPATIENT)
Age: 64
End: 2024-05-09
Payer: OTHER GOVERNMENT

## 2024-05-09 DIAGNOSIS — M25.512 CHRONIC LEFT SHOULDER PAIN: ICD-10-CM

## 2024-05-09 DIAGNOSIS — M47.817 LUMBOSACRAL SPONDYLOSIS WITHOUT MYELOPATHY: Primary | ICD-10-CM

## 2024-05-09 DIAGNOSIS — G89.29 CHRONIC LEFT SHOULDER PAIN: ICD-10-CM

## 2024-05-09 PROCEDURE — 99213 OFFICE O/P EST LOW 20 MIN: CPT | Performed by: ANESTHESIOLOGY

## 2024-06-04 ENCOUNTER — OFFICE VISIT (OUTPATIENT)
Dept: ORTHOPEDIC SURGERY | Age: 64
End: 2024-06-04
Payer: OTHER GOVERNMENT

## 2024-06-04 DIAGNOSIS — M47.817 LUMBOSACRAL SPONDYLOSIS WITHOUT MYELOPATHY: Primary | ICD-10-CM

## 2024-06-04 PROCEDURE — 64494 INJ PARAVERT F JNT L/S 2 LEV: CPT | Performed by: ANESTHESIOLOGY

## 2024-06-04 PROCEDURE — 64493 INJ PARAVERT F JNT L/S 1 LEV: CPT | Performed by: ANESTHESIOLOGY

## 2024-06-04 NOTE — PROGRESS NOTES
intermittent fluoroscopic guidance, the spinal needle needle was advanced toward the junction of the transverse process and superior articulating process, favoring the cephalad aspect of this junction. Satisfactory needle position was confirmed on AP, lateral, and oblique visualizations. 0.5 ml of 0.5% bupivacaine was then injected.    Next, an oblique view of the left L4 transverse process was then utilized to identify the junction of the junction of the transverse process and superior articulating process. The skin overlying this area was anesthetized with  0.2 ml of 1% lidocaine using a 25 G 1.5 inch needle. Next, using intermittent fluoroscopic guidance, the spinal needle needle was advanced toward the junction of the transverse process and superior articulating process, favoring the cephalad aspect of this junction. Satisfactory needle position was confirmed on AP, lateral, and oblique visualizations. 0.5 ml of 0.5% bupivacaine was then injected.    The needles were withdrawn and Band-Aids were applied. The patient was transported to the recovery room and monitored for an appropriate amount of time, and after meeting discharge criteria, was discharged home with a . No complications were noted through the procedure or recovery period.    Plan: The patient will complete a pain journal. They will follow up in clinic. If the patient receives at least 70%  pain reduction from today's procedure we can discuss pursuing a radiofrequency ablation of these nerves.

## 2024-06-05 ENCOUNTER — TELEPHONE (OUTPATIENT)
Age: 64
End: 2024-06-05

## 2024-06-05 NOTE — TELEPHONE ENCOUNTER
Procedure: Bilateral Lumbar Medial Branch Block Under Fluoroscopic Imaging, L4-L5, L5-SA Facets     Procedure Date: 6/4/24    Daily Average Pain Level: 7    1st Hour After Block: 2    2nd Hour After Block:  2    3rd Hour After Block:  8-9 (walked dog)    4th Hour After Block:  8-9    5th Hour After Block 8-9    6th Hour After Block 8    7th Hour After Block 8    8th Hour After Block 8    Activities:  walked dog    Over Pain Relief for the first 4 hours:  for the first 2 hours, 80% relief    Did the block help with your daily functions?:  No  Were you able to walk better?:  No  Whatever the pain is hindering you from doing, was it better during the block:  No  Have you continued home exercises/stretches?:  Yes    Follow up:  6/24/24

## 2024-06-24 ENCOUNTER — OFFICE VISIT (OUTPATIENT)
Age: 64
End: 2024-06-24
Payer: OTHER GOVERNMENT

## 2024-06-24 DIAGNOSIS — G89.29 CHRONIC LEFT SHOULDER PAIN: ICD-10-CM

## 2024-06-24 DIAGNOSIS — M47.817 LUMBOSACRAL SPONDYLOSIS WITHOUT MYELOPATHY: Primary | ICD-10-CM

## 2024-06-24 DIAGNOSIS — M25.512 CHRONIC LEFT SHOULDER PAIN: ICD-10-CM

## 2024-06-24 DIAGNOSIS — M51.36 DDD (DEGENERATIVE DISC DISEASE), LUMBAR: ICD-10-CM

## 2024-06-24 PROBLEM — M51.369 DDD (DEGENERATIVE DISC DISEASE), LUMBAR: Status: ACTIVE | Noted: 2024-06-24

## 2024-06-24 PROCEDURE — 99214 OFFICE O/P EST MOD 30 MIN: CPT | Performed by: PHYSICIAN ASSISTANT

## 2024-06-24 ASSESSMENT — ENCOUNTER SYMPTOMS
ALLERGIC/IMMUNOLOGIC NEGATIVE: 1
ABDOMINAL PAIN: 0
EYES NEGATIVE: 1
SHORTNESS OF BREATH: 0

## 2024-06-24 NOTE — PROGRESS NOTES
Chronic Pain Consult Note      Plan:     A comprehensive pain management plan may consist of the following: Testing, Therapy, Medications, Interventions, Consults, and Follow up.    Chronic left shoulder pain  L shoulder injection ordered - will need referral from his PCP [facility]. His R shoulder has been injected in the past per patient. Will get authorization to treat both shoulders. Given we are scheduling out to mid August, I will have him tentatively put on schedule and we will work on [facility] auth for additional diagnoses   Reached out to PCP to request referral for bilateral shoulder to get authorized [facility].   Chronic R shoulder pain  Improved after subacromial bursa injection January 2024.   Lumbar DDD   S/P L4/5 CELESTINA   Potential series discussed  Lumbosacral spondylosis without myelopathy  Procedure note reviewed. Post procedure call reviewed. I explained that based on his pre and post pain scores, he passed the first MBB. We could pursue MBB #2 but he would like to address shoulder concerns first since his back feels overall some better  RFA pending response to MBB #2.   Chronic low back pain  Encouraged an active lifestyle  Pt with UDS + for THC and ETOH in the past while receiving chronic opioids - not an opioid candidate at this time.       General Recommendations: The pain condition that the patient suffers from is best treated with a multidisciplinary approach that involves an increase in physical activity to prevent de-conditioning and worsening of the pain cycle, as well as psychological counseling (formal and/or informal) to address the co morbid psychological effects of pain. Treatment will often involve judicious use of pain medications and interventional procedures to decrease the pain, allowing the patient to participate in the physical activity that will ultimately produce long-lasting pain reductions. The goal of the

## 2024-06-25 ENCOUNTER — TELEPHONE (OUTPATIENT)
Dept: FAMILY MEDICINE CLINIC | Facility: CLINIC | Age: 64
End: 2024-06-25

## 2024-06-25 DIAGNOSIS — G89.29 CHRONIC RIGHT SHOULDER PAIN: Primary | ICD-10-CM

## 2024-06-25 DIAGNOSIS — M25.511 CHRONIC RIGHT SHOULDER PAIN: Primary | ICD-10-CM

## 2024-06-25 NOTE — TELEPHONE ENCOUNTER
Patient requesting referral to be sent over to his pain specialist, pt stated he would like for it to be sent to Dr. Ramos.

## 2024-07-05 ENCOUNTER — OFFICE VISIT (OUTPATIENT)
Age: 64
End: 2024-07-05
Payer: OTHER GOVERNMENT

## 2024-07-05 VITALS
DIASTOLIC BLOOD PRESSURE: 72 MMHG | BODY MASS INDEX: 32.44 KG/M2 | SYSTOLIC BLOOD PRESSURE: 112 MMHG | HEART RATE: 82 BPM | WEIGHT: 219 LBS | HEIGHT: 69 IN

## 2024-07-05 DIAGNOSIS — I50.22 CHRONIC HFREF (HEART FAILURE WITH REDUCED EJECTION FRACTION) (HCC): Primary | Chronic | ICD-10-CM

## 2024-07-05 DIAGNOSIS — I25.10 CORONARY ARTERY DISEASE INVOLVING NATIVE CORONARY ARTERY OF NATIVE HEART WITHOUT ANGINA PECTORIS: Chronic | ICD-10-CM

## 2024-07-05 DIAGNOSIS — I48.0 PAROXYSMAL ATRIAL FIBRILLATION (HCC): Chronic | ICD-10-CM

## 2024-07-05 PROCEDURE — 3078F DIAST BP <80 MM HG: CPT | Performed by: INTERNAL MEDICINE

## 2024-07-05 PROCEDURE — 99214 OFFICE O/P EST MOD 30 MIN: CPT | Performed by: INTERNAL MEDICINE

## 2024-07-05 PROCEDURE — 3074F SYST BP LT 130 MM HG: CPT | Performed by: INTERNAL MEDICINE

## 2024-07-05 RX ORDER — FUROSEMIDE 40 MG/1
40 TABLET ORAL DAILY
Qty: 60 TABLET | Refills: 11 | Status: SHIPPED | OUTPATIENT
Start: 2024-07-05

## 2024-07-05 RX ORDER — ATORVASTATIN CALCIUM 80 MG/1
80 TABLET, FILM COATED ORAL DAILY
Qty: 30 TABLET | Refills: 11 | Status: SHIPPED | OUTPATIENT
Start: 2024-07-05

## 2024-07-05 RX ORDER — METOPROLOL SUCCINATE 50 MG/1
50 TABLET, EXTENDED RELEASE ORAL DAILY
Qty: 30 TABLET | Refills: 11 | Status: SHIPPED | OUTPATIENT
Start: 2024-07-05

## 2024-07-05 RX ORDER — ASPIRIN 81 MG/1
81 TABLET ORAL DAILY
Qty: 30 TABLET | Refills: 11 | Status: SHIPPED | OUTPATIENT
Start: 2024-07-05

## 2024-07-05 RX ORDER — LISINOPRIL 5 MG/1
5 TABLET ORAL DAILY
Qty: 30 TABLET | Refills: 11 | Status: SHIPPED | OUTPATIENT
Start: 2024-07-05

## 2024-07-05 ASSESSMENT — ENCOUNTER SYMPTOMS: SHORTNESS OF BREATH: 0

## 2024-07-05 NOTE — PROGRESS NOTES
[facility] CARDIOLOGY  [address] [place] [address], [address]  [place] [address]  PHONE: [phone]      24    NAME:  Kyle Rojo  : 1960  MRN: [id number]         SUBJECTIVE:   Kyle Rojo is a 63 y.o. male seen for a follow up visit regarding the following:     Chief Complaint   Patient presents with    Chronic HFrEF            HPI:  Follow up  Chronic HFrEF   .      63 y.o. male with PMH CAD s/p PCI dRCA, chronic HFrEF (EF 40% 10/2020), paroxysmal afib on Xarelto s/p ablation, ILD, OMID on CPAP presenting for routine follow up. Patient has been doing well since his last visit. No chest pain, dyspnea, palpitations. He has occasional light-headedness when bending over.         Cardiac Medications       ACE Inhibitors       lisinopril (PRINIVIL;ZESTRIL) 5 MG tablet Take 1 tablet by mouth daily       Nitrates       nitroGLYCERIN (NITROSTAT) 0.4 MG SL tablet Place 1 tablet under the tongue every 5 minutes as needed for Chest pain up to max of 3 total doses. If no relief after 1 dose, call 911.       Beta Blockers Cardio-Selective       metoprolol succinate (TOPROL XL) 50 MG extended release tablet Take 1 tablet by mouth daily       Loop Diuretics       furosemide (LASIX) 40 MG tablet Take 1 tablet by mouth daily       HMG CoA Reductase Inhibitors       atorvastatin (LIPITOR) 80 MG tablet Take 1 tablet by mouth daily       Selective Aldosterone Receptor Antagonists (SARAs)       eplerenone (INSPRA) 25 MG tablet Take 0.5 tablets by mouth daily 1/2 tablet       Salicylates       aspirin 81 MG EC tablet Take 1 tablet by mouth daily       Direct Factor Xa Inhibitors       rivaroxaban (XARELTO) 20 MG TABS tablet Take 1 tablet by mouth daily          Diabetic Medications       Sodium-Glucose Co-Transporter 2 (SGLT2) Inhibitors       empagliflozin (JARDIANCE) 10 MG tablet Take 1 tablet by mouth daily                Past Medical History, Past Surgical History, Family history, Social History, and

## 2024-07-19 ENCOUNTER — OFFICE VISIT (OUTPATIENT)
Dept: FAMILY MEDICINE CLINIC | Facility: CLINIC | Age: 64
End: 2024-07-19
Payer: OTHER GOVERNMENT

## 2024-07-19 VITALS
OXYGEN SATURATION: 99 % | HEART RATE: 68 BPM | HEIGHT: 69 IN | SYSTOLIC BLOOD PRESSURE: 100 MMHG | DIASTOLIC BLOOD PRESSURE: 68 MMHG | BODY MASS INDEX: 32.75 KG/M2 | RESPIRATION RATE: 17 BRPM | WEIGHT: 221.1 LBS | TEMPERATURE: 97.9 F

## 2024-07-19 DIAGNOSIS — R11.0 NAUSEA: Primary | ICD-10-CM

## 2024-07-19 DIAGNOSIS — K21.9 GASTROESOPHAGEAL REFLUX DISEASE, UNSPECIFIED WHETHER ESOPHAGITIS PRESENT: ICD-10-CM

## 2024-07-19 PROCEDURE — 3074F SYST BP LT 130 MM HG: CPT | Performed by: FAMILY MEDICINE

## 2024-07-19 PROCEDURE — 3078F DIAST BP <80 MM HG: CPT | Performed by: FAMILY MEDICINE

## 2024-07-19 PROCEDURE — 99213 OFFICE O/P EST LOW 20 MIN: CPT | Performed by: FAMILY MEDICINE

## 2024-07-19 RX ORDER — ONDANSETRON 4 MG/1
4 TABLET, ORALLY DISINTEGRATING ORAL EVERY 8 HOURS PRN
Qty: 30 TABLET | Refills: 1 | Status: SHIPPED | OUTPATIENT
Start: 2024-07-19 | End: 2024-08-18

## 2024-07-19 RX ORDER — FAMOTIDINE 40 MG/1
40 TABLET, FILM COATED ORAL NIGHTLY PRN
Qty: 30 TABLET | Refills: 3 | Status: SHIPPED | OUTPATIENT
Start: 2024-07-19

## 2024-08-13 ENCOUNTER — OFFICE VISIT (OUTPATIENT)
Dept: ORTHOPEDIC SURGERY | Age: 64
End: 2024-08-13

## 2024-08-13 DIAGNOSIS — M47.817 LUMBOSACRAL SPONDYLOSIS WITHOUT MYELOPATHY: Primary | ICD-10-CM

## 2024-08-13 NOTE — PROGRESS NOTES
Name: Kyle Rojo   MRN:[id number]   :1960    Location: [facility]    Procedure: Bilateral Lumbar Medial Branch Block Under Fluoroscopic Imaging, L4-L5, L5-SA Facets     Pre-op Diagnosis: 1. Lumbar Spondylosis without Myelopathy. 2. Lumbar Facet Arthropathy. 3. Low Back Pain     Post-op Diagnosis: Same    Anesthesia: Local only       Complications: None    PROCEDURE: Fluroscopically Guided Lumbar Medial Branch Blocks    After confirming written and informed consent and discussing the risk, benefits and alternatives for the procedure. The patient had the correct site marked by the physician performing the procedure. The specific risks of bleeding and infection were discussed. The patient was taken to the fluoroscopy suite. The patient was then placed in the prone position. A pulse oximeter was placed, and verbal and visual monitoring were maintained throughout the procedure. The skin overlying the lumbo-sacral spine was then prepped with chlorhexidine gluconate and a sterile drape was placed. A hat and mask were worn, and the hands were cleaned with an alcohol-containing solution. Sterile gloves were then worn. A time out was then performed involving the physician, radiation technologist and the patient.    Next, the anatomy of the lumbar spine was then identified using fluoroscopic guidance. The area of usual pain was identified using patient assistance, and was confirmed with fluoroscopy. A 22 G, 5 inch Quincke needle was used for the procedure.    An oblique view of the right L4 transverse process was then utilized to identify the junction of the junction of the transverse process and superior articulating process. The skin overlying this area was anesthetized with 0.2 ml of 1% lidocaine using a 25 G 1.5 inch needle. Next, using intermittent fluoroscopic guidance, the spinal needle was advanced toward the junction of the transverse process and superior articulating process, favoring the cephalad aspect of this

## 2024-08-13 NOTE — PROGRESS NOTES
Procedure Date: 2024      Location: [facility]       Procedure: bilateral L4-SA mbb #2       Time Out performed prior to start of the procedure:       Terry Ramos M.D.  performed the following reviews on Kyle Rojo 1960 prior to the start of the procedure:       patient was identified by name and     agreement on procedure being performed was verified   risks and benefits explained to patient by the provider  procedure site verified as Bilateral  patient was positioned for comfort   consent signed and verified for procedure       Time:  7:47 AM        Procedure performed by:   Terry Ramos M.D.       Patient assisted by:   Savanah Lua MA

## 2024-08-15 ENCOUNTER — TELEPHONE (OUTPATIENT)
Age: 64
End: 2024-08-15

## 2024-08-15 NOTE — TELEPHONE ENCOUNTER
Procedure: bilateral L4-SA mbb #2    Procedure Date: 8/13/24    Daily Average Pain Level: 7    Pain level today: 10    Pain level at its worst: 10    1st Hour After Block: 2    2nd Hour After Block:  2    3rd Hour After Block:  7    4th Hour After Block:  7    5th Hour After Block: 740    6th Hour After Block: 7    7th Hour After Block: 7    8th Hour After Block: 7    Activities:  activities around the house     Overall Percentage (0% no relief, 100% complete relief) of Pain Relief for the first 4 hours:  40%    Did the block help with your daily functions?:  No  Were you able to walk better?:  No  Whatever the pain is hindering you from doing, was it better during the block?:  No  Have you continued home exercises/stretches?:  Yes    Follow up:  f/u scheduled to discuss results

## 2024-08-26 NOTE — PROGRESS NOTES
Mr Rojo is a f/u for MBB #2 which again, he thought did not help; however, review of his pre and post procedure pain scores are reassuring. His pre procedure pain score was a 7/10 and was a 2/10 post procedure for the first two hours indicating improvement initially. He wanted us to address his shoulder issues but unfortunately, it was not covered by [facility].

## 2024-08-27 ENCOUNTER — OFFICE VISIT (OUTPATIENT)
Age: 64
End: 2024-08-27
Payer: OTHER GOVERNMENT

## 2024-08-27 DIAGNOSIS — M51.36 DDD (DEGENERATIVE DISC DISEASE), LUMBAR: Primary | ICD-10-CM

## 2024-08-27 PROCEDURE — 1123F ACP DISCUSS/DSCN MKR DOCD: CPT | Performed by: PHYSICIAN ASSISTANT

## 2024-08-27 PROCEDURE — 99213 OFFICE O/P EST LOW 20 MIN: CPT | Performed by: PHYSICIAN ASSISTANT

## 2024-08-27 ASSESSMENT — ENCOUNTER SYMPTOMS
SHORTNESS OF BREATH: 0
ABDOMINAL PAIN: 0
EYES NEGATIVE: 1
ALLERGIC/IMMUNOLOGIC NEGATIVE: 1

## 2024-08-27 NOTE — PROGRESS NOTES
Chronic Pain Consult Note      Plan:     A comprehensive pain management plan may consist of the following: Testing, Therapy, Medications, Interventions, Consults, and Follow up.    Chronic left shoulder pain  L shoulder injection ordered - will need referral from his PCP [facility]. His R shoulder has been injected in the past per patient. Will get authorization to treat both shoulders. Given we are scheduling out to mid August, I will have him tentatively put on schedule and we will work on [facility] auth for additional diagnoses   Reached out to PCP to request referral for bilateral shoulder to get authorized [facility].   Chronic R shoulder pain  Improved after subacromial bursa injection January 2024.   Lumbar DDD   S/P L4/5 CELESTINA   Potential series discussed  Lumbosacral spondylosis without myelopathy  Procedure note reviewed. Post procedure call reviewed. I explained that based on his pre and post pain scores, he passed the first MBB and second MBB.   RFA to be scheduled so long as he is still living in [place].   Chronic low back pain  Encouraged an active lifestyle  Pt with UDS + for THC and ETOH in the past while receiving chronic opioids - not an opioid candidate at this time.       General Recommendations: The pain condition that the patient suffers from is best treated with a multidisciplinary approach that involves an increase in physical activity to prevent de-conditioning and worsening of the pain cycle, as well as psychological counseling (formal and/or informal) to address the co morbid psychological effects of pain. Treatment will often involve judicious use of pain medications and interventional procedures to decrease the pain, allowing the patient to participate in the physical activity that will ultimately produce long-lasting pain reductions. The goal of the multidisciplinary approach is to return the patient to a higher level of overall function  hrs. He tried to walk his dog but unfortunately the pain returned while walking the dog to the point that he needed his wife's help to get home. He says that overall his pain was improved for the first two hours and even now, where his pain was a 7-8/10 pre procedure, he averages a 4-5/10 since that time. He did want to see about getting his L shoulder pain addressed. I checked his [facility] authorization which is good until November 2024 to address his low back only. He had a R shoulder injection with Dr Ramos in early January which has given him ongoing pain relief. It was subacromial.       5/9/24:  Patient returns for follow-up and treatment regarding low back and shoulder pain.  Patient has worsening left-sided shoulder pain.  This pain is anterior.  He can feel his shoulder clicking at times.  This is worsened with activity.  We discussed proceeding with injection he would like to pursue this at this time.  Patient's low back pain is more axial in location now does not extend in the buttocks or lower extremities.  Pain is present when maintaining a stationary position either sitting or standing or ambulating.  Facet loading positive today more so than in the past.  Tender palpation over lumbar facets in the lower lumbar region.  We discussed that in combination with his skilled therapies and conservative measures for well beyond 6 months I would recommend pursuing medial branch blocks with potential RFA pending response.  He expressed understanding and desire to move forward this plan.    2/20/24 New Patient HPI:   63-year-old male presents for evaluation treatment of low back left shoulder pain.  Patient reports low back send present for a number of years but is experiencing recent flare beginning last Friday.  He has received Medrol Dosepaks intermittently in the past which provided him significant benefit and relief.  Left shoulder pain has been present for several years without specific inciting event.  Pain

## 2024-09-16 ENCOUNTER — TELEPHONE (OUTPATIENT)
Age: 64
End: 2024-09-16

## 2024-09-16 DIAGNOSIS — M51.36 DDD (DEGENERATIVE DISC DISEASE), LUMBAR: Primary | ICD-10-CM

## 2024-09-16 RX ORDER — METHYLPREDNISOLONE 4 MG
TABLET, DOSE PACK ORAL
Qty: 21 TABLET | Refills: 0 | Status: SHIPPED | OUTPATIENT
Start: 2024-09-16 | End: 2024-09-22

## 2024-10-10 NOTE — PROGRESS NOTES
Procedure Date: OCTOBER 15, 2024  Location: [facility]       Procedure: BILATERAL L4-SA RFA       Time Out performed prior to start of the procedure:       Terry Ramos M.D.  performed the following reviews on Kyle Rojo 1960 prior to the start of the procedure:       patient was identified by name and     agreement on procedure being performed was verified   risks and benefits explained to patient by the provider  procedure site verified as Bilateral  patient was positioned for comfort   consent signed and verified for procedure       Time:  9:15 AM        Procedure performed by:   Terry Ramos M.D.       Patient assisted by:   GILES OLIVAS MA

## 2024-10-15 ENCOUNTER — OFFICE VISIT (OUTPATIENT)
Dept: ORTHOPEDIC SURGERY | Age: 64
End: 2024-10-15
Payer: OTHER GOVERNMENT

## 2024-10-15 DIAGNOSIS — M47.817 LUMBOSACRAL SPONDYLOSIS WITHOUT MYELOPATHY: Primary | ICD-10-CM

## 2024-10-15 PROCEDURE — 64635 DESTROY LUMB/SAC FACET JNT: CPT | Performed by: ANESTHESIOLOGY

## 2024-10-15 PROCEDURE — 64636 DESTROY L/S FACET JNT ADDL: CPT | Performed by: ANESTHESIOLOGY

## 2024-10-15 NOTE — PROGRESS NOTES
NAME: Kyle Rojo   ID:[id number]   :1960    Location: [facility]    Procedure: Bilateral Lumbar Medial Branch Radiofrequency Ablation at L4, LS, SA Under Fluoroscopic Imaging      Pre-op Diagnosis: Lumbar Spondylosis without Myelopathy    Post-op Diagnosis: Same     Anesthesia: Local only    Complications: None    After confirming written and informed consent and discussing the risk, benefits and alternatives for the procedure, the patient had the correct site marked by the physician performing the procedure. The specific risks of bleeding and infection were discussed, as was the specific risk of neuritis. The patient was taken to the fluoroscopy suite. A pulse oximeter was placed, and verbal and visual monitoring were maintained throughout the procedure. The patient was then placed in the prone position. The skin overlying the lumbo-sacral spine was then prepped with chlorhexidine gluconate and a sterile drape was placed. A hat and mask were worn, and the hands were cleaned with an alcohol-containing solution. Sterile gloves were then worn. A time out was then performed involving the physician, radiation technologist and the patient.    A 20 G, 10 cm radiofrequency cannula with a 10 mm active tip was used at each level. Next, the anatomy of the lumbar spine was then identified using fluoroscopic guidance.    An oblique view of the L4 transverse process was then utilized to identify the junction of the junction of the transverse process and superior articulating process. The skin overlying this area was anesthetized with 0.2 ml of 1% lidocaine using a 25 G 1.5 inch needle. Next, using intermittent fluoroscopic guidance, the radiofrequency ablation cannula was advanced toward the junction of the transverse process and superior articulating process, favoring the cephalad aspect of this junction and along the course of the medial branch. Satisfactory needle position was confirmed on AP, lateral, and oblique

## 2024-10-17 ENCOUNTER — OFFICE VISIT (OUTPATIENT)
Dept: FAMILY MEDICINE CLINIC | Facility: CLINIC | Age: 64
End: 2024-10-17
Payer: OTHER GOVERNMENT

## 2024-10-17 VITALS
SYSTOLIC BLOOD PRESSURE: 100 MMHG | HEART RATE: 64 BPM | TEMPERATURE: 98.4 F | BODY MASS INDEX: 32.56 KG/M2 | HEIGHT: 69 IN | DIASTOLIC BLOOD PRESSURE: 80 MMHG | WEIGHT: 219.8 LBS | OXYGEN SATURATION: 99 % | RESPIRATION RATE: 16 BRPM

## 2024-10-17 DIAGNOSIS — K21.9 GASTROESOPHAGEAL REFLUX DISEASE, UNSPECIFIED WHETHER ESOPHAGITIS PRESENT: ICD-10-CM

## 2024-10-17 DIAGNOSIS — R11.0 NAUSEA: Primary | ICD-10-CM

## 2024-10-17 PROCEDURE — 99213 OFFICE O/P EST LOW 20 MIN: CPT | Performed by: FAMILY MEDICINE

## 2024-10-17 PROCEDURE — 3074F SYST BP LT 130 MM HG: CPT | Performed by: FAMILY MEDICINE

## 2024-10-17 PROCEDURE — 3079F DIAST BP 80-89 MM HG: CPT | Performed by: FAMILY MEDICINE

## 2024-10-17 PROCEDURE — 1123F ACP DISCUSS/DSCN MKR DOCD: CPT | Performed by: FAMILY MEDICINE

## 2024-10-17 RX ORDER — SCOLOPAMINE TRANSDERMAL SYSTEM 1 MG/1
1 PATCH, EXTENDED RELEASE TRANSDERMAL
Qty: 4 PATCH | Refills: 0 | Status: SHIPPED | OUTPATIENT
Start: 2024-10-17

## 2024-10-17 RX ORDER — FAMOTIDINE 40 MG/1
20 TABLET, FILM COATED ORAL NIGHTLY PRN
Qty: 30 TABLET | Refills: 3 | Status: SHIPPED | OUTPATIENT
Start: 2024-10-17

## 2024-10-17 ASSESSMENT — PATIENT HEALTH QUESTIONNAIRE - PHQ9
2. FEELING DOWN, DEPRESSED OR HOPELESS: NOT AT ALL
SUM OF ALL RESPONSES TO PHQ QUESTIONS 1-9: 0
SUM OF ALL RESPONSES TO PHQ QUESTIONS 1-9: 0
1. LITTLE INTEREST OR PLEASURE IN DOING THINGS: NOT AT ALL
SUM OF ALL RESPONSES TO PHQ9 QUESTIONS 1 & 2: 0
SUM OF ALL RESPONSES TO PHQ QUESTIONS 1-9: 0
SUM OF ALL RESPONSES TO PHQ QUESTIONS 1-9: 0

## 2024-10-17 NOTE — PROGRESS NOTES
Kyle Rojo (:  1960) is a 64 y.o. male,Established patient, here for evaluation of the following chief complaint(s):  No chief complaint on file.         Assessment & Plan  Gastroesophageal reflux disease, unspecified whether esophagitis present   Chronic, not at goal (unstable), changes made today: Prescribed famotidine    Nausea   Chronic, not at goal (unstable), continue current treatment plan      Subjective   HPI  Kyle is a pleasant 63-year-old male who presents today for a follow-up of his chronic medical issues of GERD and nausea.    Patient was recently evaluated for similar complaints about 2 to 3 months ago.  He reports he continues to have ongoing issue of nausea and thinks is likely iatrogenic from other medications he is on.  He requests something for symptom relief.    He also reports ongoing issues with breakthrough acid reflux issues and is requesting additional medication for this issue.  Denies any other complaints including headaches, fevers, chills, vomiting, chest pain or palpitations, diarrhea or any other symptoms   Review of Systems   All other pertinent systems reviewed and negative except as noted in the HPI.      Objective   Physical Exam   General: Well appearing, well nourished, NAD.   Head: NCAT   Eyes: conjunctiva clear, sclera non-icteric  Oral cavity : No lesions, moist mucous membranes, no exudates or tonsillar hypertrophy   Neck: Supple, without lesions  Heart: RRR, No cardiomegaly,murmurs or gallop  Lungs:CTAB, no wheezes or rhonchi  GI: Normal bowel sounds, non tender, non distended, No masses or hernia  Skin: Good turgor, no rash, unusual bruising or prominent lesions  Extremities: No cyanosis or edema,  peripheral pulses intact, Capillary refill <3 sec.  Musculoskeletal: ROM intact. No swelling or erythema.  Neurologic: No focal deficits.   Psychiatric: AAO X3, Appropriate judgment and insight, normal mood and affect.     An electronic signature was used to